# Patient Record
Sex: FEMALE | Race: WHITE | Employment: OTHER | URBAN - METROPOLITAN AREA
[De-identification: names, ages, dates, MRNs, and addresses within clinical notes are randomized per-mention and may not be internally consistent; named-entity substitution may affect disease eponyms.]

---

## 2017-01-13 ENCOUNTER — ALLIED HEALTH/NURSE VISIT (OUTPATIENT)
Dept: CARDIOLOGY | Facility: CLINIC | Age: 82
End: 2017-01-13
Payer: COMMERCIAL

## 2017-01-13 DIAGNOSIS — Z95.0 CARDIAC PACEMAKER IN SITU: Primary | ICD-10-CM

## 2017-01-13 PROCEDURE — 93293 PM PHONE R-STRIP DEVICE EVAL: CPT | Performed by: INTERNAL MEDICINE

## 2017-01-13 NOTE — PROGRESS NOTES
~ 90 day phone teletrace ~  AS/ at time of check. Magnet response WNL. Will contact [employer] rep and they will do annual threshold in April at care facility. Cristiane IBRAHIM

## 2017-04-21 ENCOUNTER — DOCUMENTATION ONLY (OUTPATIENT)
Dept: CARDIOLOGY | Facility: CLINIC | Age: 82
End: 2017-04-21

## 2017-04-21 NOTE — PROGRESS NOTES
[employer] [facility] Annual Threshold Rep Check-No Charge  62% A. Paced and 100% V. Paced in mode DDDR 60. Presenting rhythm is AP/ and underlying rhythm is CHB. No mode switches or VHR's logged. Lead measurements are stable. Battery longevity is 3.5 yrs. Next phone teletrace scheduled for July. ROSA ISELA Castro RN.

## 2017-07-14 ENCOUNTER — ALLIED HEALTH/NURSE VISIT (OUTPATIENT)
Dept: CARDIOLOGY | Facility: CLINIC | Age: 82
End: 2017-07-14
Payer: COMMERCIAL

## 2017-07-14 DIAGNOSIS — Z95.0 CARDIAC PACEMAKER IN SITU: Primary | ICD-10-CM

## 2017-07-14 PROCEDURE — 93293 PM PHONE R-STRIP DEVICE EVAL: CPT | Performed by: INTERNAL MEDICINE

## 2017-07-14 NOTE — MR AVS SNAPSHOT
"              After Visit Summary   7/14/2017    Margarette Saeed    MRN: [id number]           Patient Information     Date Of Birth          12/22/1919        Visit Information        Provider Department      7/14/2017 10:45 AM LLANES TECH1 [facility]        Today's Diagnoses     Cardiac pacemaker in situ    -  1       Follow-ups after your visit        Your next 10 appointments already scheduled     Oct 13, 2017 10:30 AM CDT   Phone Device Check with LLANES TECH1   [facility] ([facility])    [address] [place] [id number] [phone]              Who to contact     If you have questions or need follow up information about today's clinic visit or your schedule please contact [facility] directly at [phone].  Normal or non-critical lab and imaging results will be communicated to you by Freespeehart, letter or phone within 4 business days after the clinic has received the results. If you do not hear from us within 7 days, please contact the clinic through Freespeehart or phone. If you have a critical or abnormal lab result, we will notify you by phone as soon as possible.  Submit refill requests through [employer] or call your pharmacy and they will forward the refill request to us. Please allow 3 business days for your refill to be completed.          Additional Information About Your Visit        MyChart Information     [employer] lets you send messages to your doctor, view your test results, renew your prescriptions, schedule appointments and more. To sign up, go to [email or web address] . Click on \"Log in\" on the left side of the screen, which will take you to the Welcome page. Then click on \"Sign up Now\" on the right side of the page.     You will be asked to enter the access code listed below, as well as some personal information. Please follow the directions to create " your username and password.     Your access code is: [email or web address]  Expires: 10/12/2017 10:54 AM     Your access code will  in 90 days. If you need help or a new code, please call your [place] clinic or [phone].        Care EveryWhere ID     This is your Care EveryWhere ID. This could be used by other organizations to access your [place] medical records  [id number]         Blood Pressure from Last 3 Encounters:   11/15/16 173/78   11/20/15 132/80   09/12/15 191/88    Weight from Last 3 Encounters:   11/20/15 55.3 kg (121 lb 14.4 oz)   14 51.6 kg (113 lb 11.2 oz)   14 51.1 kg (112 lb 10.5 oz)              We Performed the Following     PM PHONE R STRIP EVAL UP TO 90 DAYS ([id number])        Primary Care Provider Office Phone # Fax #    Aniyah Cydney [phone]       [facility] [address] [facility] [address]  [facility] [address]        Equal Access to Services     MICHELLE SENA : Hadii noman ku hadasho Somegan, waaxda luqadaha, qaybta kaalmada casey, anthony dela cruz. So [facility] [phone].    ATENCIÓN: Si habla español, tiene a merino disposición servicios gratuitos de asistencia lingüística. [facility] [phone].    We comply with applicable federal civil rights laws and [place] laws. We do not discriminate on the basis of race, color, national origin, age, disability sex, sexual orientation or gender identity.            Thank you!     Thank you for choosing [facility] [place]  for your care. Our goal is always to provide you with excellent care. Hearing back from our patients is one way we can continue to improve our services. Please take a few minutes to complete the written survey that you may receive in the mail after your visit with us. Thank you!             Your Updated Medication List - Protect others around you: Learn how to safely use, store and throw away your medicines at www.disposemymeds.org.           This list is accurate as of: 7/14/17 10:54 AM.  Always use your most recent med list.                   Brand Name Dispense Instructions for use Diagnosis    acetaminophen 325 MG tablet    TYLENOL    100 tablet    Take 2 tablets (650 mg) by mouth 2 times daily as needed for pain        amoxicillin 500 MG capsule    AMOXIL     (4) po prior to dental appt. Per Dr. Natalie Almanza DDS:  1 hour prior to appt.  Administer 2000mg (4)-500 mg tablets orally prn        atenolol 50 MG tablet    TENORMIN    90 tablet    Take 1 tablet by mouth daily.    HTN (hypertension)       calcium 600 MG tablet      1 tablet 2 times daily        CERTAVITE/ANTIOXIDANTS Tabs tablet   Generic drug:  multivitamin, therapeutic with minerals      Take  by mouth. New formula 2010  mg daily        cholecalciferol 1000 UNITS Tabs      Take 1 tablet by mouth daily.        HYDROcodone-acetaminophen 5-325 MG per tablet    NORCO    15 tablet    Take 1 tablet by mouth every 8 hours as needed for moderate to severe pain        levothyroxine 50 MCG tablet    SYNTHROID/LEVOTHROID    90 tablet    Take 1 tablet by mouth daily.    Hypothyroidism       OMEGA-3 FISH OIL PO      Take 1 g by mouth daily        pantoprazole 40 MG EC tablet    PROTONIX     Take 1 tablet by mouth daily.    Duodenal ulcer with hemorrhage       simvastatin 40 MG tablet    ZOCOR    90 tablet    Take 1 tablet by mouth At Bedtime.    Hyperlipidemia LDL goal <130       SUPER B COMPLEX MAXI PO      Take 1 tablet by mouth daily.        traMADol 50 MG tablet    ULTRAM    15 tablet    Take 1 tablet (50 mg) by mouth every 6 hours as needed for moderate pain        traZODone 50 MG tablet    DESYREL    45 tablet    Take 0.5 tablets by mouth nightly as needed for sleep.    Insomnia

## 2017-07-14 NOTE — PROGRESS NOTES
~ 90 day phone teletrace ~  AP/ with bigeminal PACs. Magnet response WNL. FF/U scheduled q 3 months. Cristiane IBRAHIM

## 2017-10-13 ENCOUNTER — ALLIED HEALTH/NURSE VISIT (OUTPATIENT)
Dept: CARDIOLOGY | Facility: CLINIC | Age: 82
End: 2017-10-13
Payer: COMMERCIAL

## 2017-10-13 DIAGNOSIS — Z95.0 CARDIAC PACEMAKER IN SITU: Primary | ICD-10-CM

## 2017-10-13 PROCEDURE — 93293 PM PHONE R-STRIP DEVICE EVAL: CPT | Performed by: INTERNAL MEDICINE

## 2017-10-13 NOTE — MR AVS SNAPSHOT
"              After Visit Summary   10/13/2017    Margarette Saeed    MRN: [id number]           Patient Information     Date Of Birth          12/22/1919        Visit Information        Provider Department      10/13/2017 10:30 AM LLANES TECH1 [facility]        Today's Diagnoses     Cardiac pacemaker in situ    -  1       Follow-ups after your visit        Your next 10 appointments already scheduled     Jan 12, 2018  9:30 AM CST   Phone Device Check with LLANES TECH1   [facility] ([facility])    [address] [place] [id number] [phone]              Who to contact     If you have questions or need follow up information about today's clinic visit or your schedule please contact [facility] directly at [phone].  Normal or non-critical lab and imaging results will be communicated to you by Splendid Labhart, letter or phone within 4 business days after the clinic has received the results. If you do not hear from us within 7 days, please contact the clinic through MyChart or phone. If you have a critical or abnormal lab result, we will notify you by phone as soon as possible.  Submit refill requests through [employer] or call your pharmacy and they will forward the refill request to us. Please allow 3 business days for your refill to be completed.          Additional Information About Your Visit        Splendid Labhart Information     [employer] lets you send messages to your doctor, view your test results, renew your prescriptions, schedule appointments and more. To sign up, go to [email or web address] . Click on \"Log in\" on the left side of the screen, which will take you to the Welcome page. Then click on \"Sign up Now\" on the right side of the page.     You will be asked to enter the access code listed below, as well as some personal information. Please follow the directions to create " your username and password.     Your access code is: [email or web address]  Expires: 2018 11:48 AM     Your access code will  in 90 days. If you need help or a new code, please call your [place] clinic or [phone].        Care EveryWhere ID     This is your Care EveryWhere ID. This could be used by other organizations to access your [place] medical records  [id number]         Blood Pressure from Last 3 Encounters:   11/15/16 173/78   11/20/15 132/80   09/12/15 191/88    Weight from Last 3 Encounters:   11/20/15 55.3 kg (121 lb 14.4 oz)   14 51.6 kg (113 lb 11.2 oz)   14 51.1 kg (112 lb 10.5 oz)              We Performed the Following     PM PHONE R STRIP EVAL UP TO 90 DAYS ([id number])        Primary Care Provider Office Phone # Fax #    Aniyah Cydney [phone]       [facility] [address] [facility] [address]  [facility] [address]        Equal Access to Services     [facility] : Hadii noman mcdonnell hadwaldemar Somegan, waaxda luqadaha, qaybta kaalmadeniz peña, anthony dela cruz. So [facility] [phone].    ATENCIÓN: Si habla español, tiene a merino disposición servicios gratuitos de asistencia lingüística. [facility] [phone].    We comply with applicable federal civil rights laws and [place] laws. We do not discriminate on the basis of race, color, national origin, age, disability, sex, sexual orientation, or gender identity.            Thank you!     Thank you for choosing [facility] [place]  for your care. Our goal is always to provide you with excellent care. Hearing back from our patients is one way we can continue to improve our services. Please take a few minutes to complete the written survey that you may receive in the mail after your visit with us. Thank you!             Your Updated Medication List - Protect others around you: Learn how to safely use, store and throw away your medicines at www.disposemymeds.org.           This list is accurate as of: 10/13/17 11:48 AM.  Always use your most recent med list.                   Brand Name Dispense Instructions for use Diagnosis    acetaminophen 325 MG tablet    TYLENOL    100 tablet    Take 2 tablets (650 mg) by mouth 2 times daily as needed for pain        amoxicillin 500 MG capsule    AMOXIL     (4) po prior to dental appt. Per Dr. Natalie Almanza DDS:  1 hour prior to appt.  Administer 2000mg (4)-500 mg tablets orally prn        atenolol 50 MG tablet    TENORMIN    90 tablet    Take 1 tablet by mouth daily.    HTN (hypertension)       calcium 600 MG tablet      1 tablet 2 times daily        CERTAVITE/ANTIOXIDANTS Tabs tablet   Generic drug:  multivitamin, therapeutic with minerals      Take  by mouth. New formula 2010  mg daily        cholecalciferol 1000 UNITS Tabs      Take 1 tablet by mouth daily.        HYDROcodone-acetaminophen 5-325 MG per tablet    NORCO    15 tablet    Take 1 tablet by mouth every 8 hours as needed for moderate to severe pain        levothyroxine 50 MCG tablet    SYNTHROID/LEVOTHROID    90 tablet    Take 1 tablet by mouth daily.    Hypothyroidism       OMEGA-3 FISH OIL PO      Take 1 g by mouth daily        pantoprazole 40 MG EC tablet    PROTONIX     Take 1 tablet by mouth daily.    Duodenal ulcer with hemorrhage       simvastatin 40 MG tablet    ZOCOR    90 tablet    Take 1 tablet by mouth At Bedtime.    Hyperlipidemia LDL goal <130       SUPER B COMPLEX MAXI PO      Take 1 tablet by mouth daily.        traMADol 50 MG tablet    ULTRAM    15 tablet    Take 1 tablet (50 mg) by mouth every 6 hours as needed for moderate pain        traZODone 50 MG tablet    DESYREL    45 tablet    Take 0.5 tablets by mouth nightly as needed for sleep.    Insomnia

## 2017-10-13 NOTE — PROGRESS NOTES
~ 90 day phone teletrace ~  AS/ at time of check. Magnet response WNL. F/U scheduled q 3 months. Cristiane IBRAHIM

## 2018-01-01 ENCOUNTER — ALLIED HEALTH/NURSE VISIT (OUTPATIENT)
Dept: CARDIOLOGY | Facility: CLINIC | Age: 83
End: 2018-01-01
Payer: COMMERCIAL

## 2018-01-01 DIAGNOSIS — Z95.0 CARDIAC PACEMAKER IN SITU: Primary | ICD-10-CM

## 2018-01-01 DIAGNOSIS — R55 SYNCOPE: Primary | ICD-10-CM

## 2018-01-01 PROCEDURE — 93293 PM PHONE R-STRIP DEVICE EVAL: CPT | Performed by: INTERNAL MEDICINE

## 2018-01-12 ENCOUNTER — ALLIED HEALTH/NURSE VISIT (OUTPATIENT)
Dept: CARDIOLOGY | Facility: CLINIC | Age: 83
End: 2018-01-12
Payer: COMMERCIAL

## 2018-01-12 DIAGNOSIS — Z95.0 CARDIAC PACEMAKER IN SITU: Primary | ICD-10-CM

## 2018-01-12 PROCEDURE — 93293 PM PHONE R-STRIP DEVICE EVAL: CPT | Performed by: INTERNAL MEDICINE

## 2018-01-12 NOTE — MR AVS SNAPSHOT
"              After Visit Summary   2018    Margarette Saeed    MRN: [id number]           Patient Information     Date Of Birth          1919        Visit Information        Provider Department      2018 9:30 AM LLANES TECH1 [facility]        Today's Diagnoses     Cardiac pacemaker in situ    -  1       Follow-ups after your visit        Who to contact     If you have questions or need follow up information about today's clinic visit or your schedule please contact [facility] directly at [phone].  Normal or non-critical lab and imaging results will be communicated to you by GoingOnhart, letter or phone within 4 business days after the clinic has received the results. If you do not hear from us within 7 days, please contact the clinic through [employer] or phone. If you have a critical or abnormal lab result, we will notify you by phone as soon as possible.  Submit refill requests through [employer] or call your pharmacy and they will forward the refill request to us. Please allow 3 business days for your refill to be completed.          Additional Information About Your Visit        MyChart Information     [employer] lets you send messages to your doctor, view your test results, renew your prescriptions, schedule appointments and more. To sign up, go to [email or web address] . Click on \"Log in\" on the left side of the screen, which will take you to the Welcome page. Then click on \"Sign up Now\" on the right side of the page.     You will be asked to enter the access code listed below, as well as some personal information. Please follow the directions to create your username and password.     Your access code is: [email or web address]  Expires: 2018 10:35 AM     Your access code will  in 90 days. If you need help or a new code, please call your [place] clinic or [phone].        Care EveryWhere ID     This is your Care " EveryWhere ID. This could be used by other organizations to access your [place] medical records  [id number]         Blood Pressure from Last 3 Encounters:   11/15/16 173/78   11/20/15 132/80   09/12/15 191/88    Weight from Last 3 Encounters:   11/20/15 55.3 kg (121 lb 14.4 oz)   06/16/14 51.6 kg (113 lb 11.2 oz)   06/03/14 51.1 kg (112 lb 10.5 oz)              We Performed the Following     PM PHONE R STRIP EVAL UP TO 90 DAYS ([id number])        Primary Care Provider Office Phone # Fax #    Aniyah MD Cydney [phone]       [facility] [address] [place] [id number]        Equal Access to Services     XAVIER SENA : Hadii aad ku hadasho Somegan, waaxda luqadaha, qaybta kaalmada adeegyada, anthony dela cruz. So [facility] [phone].    ATENCIÓN: Si habla español, tiene a merino disposición servicios gratuitos de asistencia lingüística. Jak al [phone].    We comply with applicable federal civil rights laws and [place] laws. We do not discriminate on the basis of race, color, national origin, age, disability, sex, sexual orientation, or gender identity.            Thank you!     Thank you for choosing [facility]  for your care. Our goal is always to provide you with excellent care. Hearing back from our patients is one way we can continue to improve our services. Please take a few minutes to complete the written survey that you may receive in the mail after your visit with us. Thank you!             Your Updated Medication List - Protect others around you: Learn how to safely use, store and throw away your medicines at www.disposemymeds.org.          This list is accurate as of: 1/12/18 10:35 AM.  Always use your most recent med list.                   Brand Name Dispense Instructions for use Diagnosis    acetaminophen 325 MG tablet    TYLENOL    100 tablet    Take 2 tablets (650 mg) by mouth 2 times daily as needed for pain         amoxicillin 500 MG capsule    AMOXIL     (4) po prior to dental appt. Per Dr. Natalie Almanza DDS:  1 hour prior to appt.  Administer 2000mg (4)-500 mg tablets orally prn        atenolol 50 MG tablet    TENORMIN    90 tablet    Take 1 tablet by mouth daily.    HTN (hypertension)       calcium 600 MG tablet      1 tablet 2 times daily        CERTAVITE/ANTIOXIDANTS Tabs tablet   Generic drug:  multivitamin, therapeutic with minerals      Take  by mouth. New formula 2010  mg daily        cholecalciferol 1000 UNITS Tabs      Take 1 tablet by mouth daily.        HYDROcodone-acetaminophen 5-325 MG per tablet    NORCO    15 tablet    Take 1 tablet by mouth every 8 hours as needed for moderate to severe pain        levothyroxine 50 MCG tablet    SYNTHROID/LEVOTHROID    90 tablet    Take 1 tablet by mouth daily.    Hypothyroidism       OMEGA-3 FISH OIL PO      Take 1 g by mouth daily        pantoprazole 40 MG EC tablet    PROTONIX     Take 1 tablet by mouth daily.    Duodenal ulcer with hemorrhage       simvastatin 40 MG tablet    ZOCOR    90 tablet    Take 1 tablet by mouth At Bedtime.    Hyperlipidemia LDL goal <130       SUPER B COMPLEX MAXI PO      Take 1 tablet by mouth daily.        traMADol 50 MG tablet    ULTRAM    15 tablet    Take 1 tablet (50 mg) by mouth every 6 hours as needed for moderate pain        traZODone 50 MG tablet    DESYREL    45 tablet    Take 0.5 tablets by mouth nightly as needed for sleep.    Insomnia

## 2018-01-12 NOTE — PROGRESS NOTES
~ 90 day phone teletrace ~  AP/ and AS/ at time of check. Magnet response WNL. Will notify [employer] to do annual threshold in April. Cristiane IBRAHIM

## 2018-04-19 ENCOUNTER — DOCUMENTATION ONLY (OUTPATIENT)
Dept: CARDIOLOGY | Facility: CLINIC | Age: 83
End: 2018-04-19

## 2018-04-19 NOTE — PROGRESS NOTES
Nursing home check - Success     Presenting AP/  AP 84%  100%  histogram - minimal variability   3.5 years battery   Impedance, Sensing, and Threshold - WNL  2 brief mode switches 0% of the time    Left voicemail with memory care to call back to schedule a phone check. SP

## 2018-07-19 ENCOUNTER — ALLIED HEALTH/NURSE VISIT (OUTPATIENT)
Dept: CARDIOLOGY | Facility: CLINIC | Age: 83
End: 2018-07-19
Payer: COMMERCIAL

## 2018-07-19 DIAGNOSIS — Z95.0 CARDIAC PACEMAKER IN SITU: Primary | ICD-10-CM

## 2018-07-19 PROCEDURE — 93293 PM PHONE R-STRIP DEVICE EVAL: CPT | Performed by: INTERNAL MEDICINE

## 2018-07-19 NOTE — NURSING NOTE
~ 90 day phone teletrace ~  AP/VS at time of check. Magnet response WNL.FU 3 mo phone teletrace.

## 2018-07-19 NOTE — MR AVS SNAPSHOT
"              After Visit Summary   7/19/2018    Margarette Saeed    MRN: [id number]           Patient Information     Date Of Birth          12/22/1919        Visit Information        Provider Department      7/19/2018 1:30 PM MARIO ALBERTO WESTON [facility]        Today's Diagnoses     Cardiac pacemaker in situ    -  1       Follow-ups after your visit        Your next 10 appointments already scheduled     Oct 25, 2018  1:30 PM CDT   Phone Device Check with MARIO ALBERTO WESTON   [facility] ([facility])    [address] [place] [address] [id number] [phone] OPT 2              Who to contact     If you have questions or need follow up information about today's clinic visit or your schedule please contact [facility] directly at [phone].  Normal or non-critical lab and imaging results will be communicated to you by [employer], letter or phone within 4 business days after the clinic has received the results. If you do not hear from us within 7 days, please contact the clinic through [employer] or phone. If you have a critical or abnormal lab result, we will notify you by phone as soon as possible.  Submit refill requests through [employer] or call your pharmacy and they will forward the refill request to us. Please allow 3 business days for your refill to be completed.          Additional Information About Your Visit        MyChart Information     [employer] lets you send messages to your doctor, view your test results, renew your prescriptions, schedule appointments and more. To sign up, go to [email or web address] . Click on \"Log in\" on the left side of the screen, which will take you to the Welcome page. Then click on \"Sign up Now\" on the right side of the page.     You will be asked to enter the access code listed below, as well as some personal information. Please follow the directions to create your " username and password.     Your access code is: [email or web address]  Expires: 10/17/2018  1:41 PM     Your access code will  in 90 days. If you need help or a new code, please call your [facility] clinic or [phone].        Care EveryWhere ID     This is your Care EveryWhere ID. This could be used by other organizations to access your [facility] medical records  [id number]         Blood Pressure from Last 3 Encounters:   11/15/16 173/78   11/20/15 132/80   09/12/15 191/88    Weight from Last 3 Encounters:   11/20/15 55.3 kg (121 lb 14.4 oz)   14 51.6 kg (113 lb 11.2 oz)   14 51.1 kg (112 lb 10.5 oz)              We Performed the Following     PM PHONE R STRIP EVAL UP TO 90 DAYS ([id number])        Primary Care Provider Office Phone # Fax #    Aniyah MD Roel [phone]       [facility] [address] [facility] [address]        Equal Access to Services     [facility] : Hadii noman mcdonnell hadwaldemar Somegan, waaxda luede, qaybta kaaleriberto peña, anthony dela cruz. So [facility] [phone].    ATENCIÓN: Si habla español, tiene a merino disposición servicios gratuitos de asistencia lingüística. Jak al [phone].    We comply with applicable federal civil rights laws and [place] laws. We do not discriminate on the basis of race, color, national origin, age, disability, sex, sexual orientation, or gender identity.            Thank you!     Thank you for choosing [facility]  for your care. Our goal is always to provide you with excellent care. Hearing back from our patients is one way we can continue to improve our services. Please take a few minutes to complete the written survey that you may receive in the mail after your visit with us. Thank you!             Your Updated Medication List - Protect others around you: Learn how to safely use, store and throw away your medicines at www.disposemymeds.org.          This list is  accurate as of 7/19/18  1:41 PM.  Always use your most recent med list.                   Brand Name Dispense Instructions for use Diagnosis    acetaminophen 325 MG tablet    TYLENOL    100 tablet    Take 2 tablets (650 mg) by mouth 2 times daily as needed for pain        amoxicillin 500 MG capsule    AMOXIL     (4) po prior to dental appt. Per Dr. Natalie Almanza DDS:  1 hour prior to appt.  Administer 2000mg (4)-500 mg tablets orally prn        atenolol 50 MG tablet    TENORMIN    90 tablet    Take 1 tablet by mouth daily.    HTN (hypertension)       calcium 600 MG tablet      1 tablet 2 times daily        CERTAVITE/ANTIOXIDANTS Tabs tablet   Generic drug:  multivitamin, therapeutic with minerals      Take  by mouth. New formula 2010  mg daily        cholecalciferol 1000 units Tabs      Take 1 tablet by mouth daily.        HYDROcodone-acetaminophen 5-325 MG per tablet    NORCO    15 tablet    Take 1 tablet by mouth every 8 hours as needed for moderate to severe pain        levothyroxine 50 MCG tablet    SYNTHROID/LEVOTHROID    90 tablet    Take 1 tablet by mouth daily.    Hypothyroidism       OMEGA-3 FISH OIL PO      Take 1 g by mouth daily        pantoprazole 40 MG EC tablet    PROTONIX     Take 1 tablet by mouth daily.    Duodenal ulcer with hemorrhage       simvastatin 40 MG tablet    ZOCOR    90 tablet    Take 1 tablet by mouth At Bedtime.    Hyperlipidemia LDL goal <130       SUPER B COMPLEX MAXI PO      Take 1 tablet by mouth daily.        traMADol 50 MG tablet    ULTRAM    15 tablet    Take 1 tablet (50 mg) by mouth every 6 hours as needed for moderate pain        traZODone 50 MG tablet    DESYREL    45 tablet    Take 0.5 tablets by mouth nightly as needed for sleep.    Insomnia

## 2018-10-25 NOTE — PROGRESS NOTES
~ 90 day phone teletrace ~  Dual chamber paced at time of check. Magnet response WNL. F/U phone teletrace q 3 months. Pt does not leave care facility for appts. Sees facility MD. Cristiane DUGANT

## 2018-10-25 NOTE — MR AVS SNAPSHOT
"              After Visit Summary   10/25/2018    Margarette Saeed    MRN: [id number]           Patient Information     Date Of Birth          12/22/1919        Visit Information        Provider Department      10/25/2018 1:30 PM MARIO ALBERTO WESTON [facility]        Today's Diagnoses     Cardiac pacemaker in situ    -  1       Follow-ups after your visit        Your next 10 appointments already scheduled     Jan 31, 2019  2:00 PM CST   Phone Device Check with LLANES TECH1   [facility] ([facility])    [address] [place], [place] [address]  [facility] [address] [phone] OPT 2              Who to contact     If you have questions or need follow up information about today's clinic visit or your schedule please contact [facility] directly at [phone].  Normal or non-critical lab and imaging results will be communicated to you by [employer], letter or phone within 4 business days after the clinic has received the results. If you do not hear from us within 7 days, please contact the clinic through [employer] or phone. If you have a critical or abnormal lab result, we will notify you by phone as soon as possible.  Submit refill requests through [employer] or call your pharmacy and they will forward the refill request to us. Please allow 3 business days for your refill to be completed.          Additional Information About Your Visit        [employer]art Information     [employer] lets you send messages to your doctor, view your test results, renew your prescriptions, schedule appointments and more. To sign up, go to [email or web address] . Click on \"Log in\" on the left side of the screen, which will take you to the Welcome page. Then click on \"Sign up Now\" on the right side of the page.     You will be asked to enter the access code listed below, as well as some personal information. Please follow the directions to create " your username and password.     Your access code is: [email or web address]  Expires: 2019  2:20 PM     Your access code will  in 90 days. If you need help or a new code, please call your [place] clinic or [phone].        Care EveryWhere ID     This is your Care EveryWhere ID. This could be used by other organizations to access your [place] medical records  [id number]         Blood Pressure from Last 3 Encounters:   11/15/16 173/78   11/20/15 132/80   09/12/15 191/88    Weight from Last 3 Encounters:   11/20/15 55.3 kg (121 lb 14.4 oz)   14 51.6 kg (113 lb 11.2 oz)   14 51.1 kg (112 lb 10.5 oz)              We Performed the Following     PM PHONE R STRIP EVAL UP TO 90 DAYS ([id number])        Primary Care Provider Office Phone # Fax #    Aniyah MD Roel [phone]       [facility] [address] [facility] [address]        Equal Access to Services     [facility] : Hadii noman durham Somegan, waaxda luede, qaybta kaaleriberto peña, anthony dela cruz. So [facility] [phone].    ATENCIÓN: Si habla español, tiene a merino disposición servicios gratuitos de asistencia lingüística. Jak al [phone].    We comply with applicable federal civil rights laws and [place] laws. We do not discriminate on the basis of race, color, national origin, age, disability, sex, sexual orientation, or gender identity.            Thank you!     Thank you for choosing [facility]  for your care. Our goal is always to provide you with excellent care. Hearing back from our patients is one way we can continue to improve our services. Please take a few minutes to complete the written survey that you may receive in the mail after your visit with us. Thank you!             Your Updated Medication List - Protect others around you: Learn how to safely use, store and throw away your medicines at www.disposemymeds.org.          This list is  accurate as of 10/25/18  2:20 PM.  Always use your most recent med list.                   Brand Name Dispense Instructions for use Diagnosis    acetaminophen 325 MG tablet    TYLENOL    100 tablet    Take 2 tablets (650 mg) by mouth 2 times daily as needed for pain        amoxicillin 500 MG capsule    AMOXIL     (4) po prior to dental appt. Per Dr. Natalie Almanza DDS:  1 hour prior to appt.  Administer 2000mg (4)-500 mg tablets orally prn        atenolol 50 MG tablet    TENORMIN    90 tablet    Take 1 tablet by mouth daily.    HTN (hypertension)       calcium 600 MG tablet      1 tablet 2 times daily        CERTAVITE/ANTIOXIDANTS Tabs tablet   Generic drug:  multivitamin, therapeutic with minerals      Take  by mouth. New formula 2010  mg daily        cholecalciferol 1000 units Tabs      Take 1 tablet by mouth daily.        HYDROcodone-acetaminophen 5-325 MG per tablet    NORCO    15 tablet    Take 1 tablet by mouth every 8 hours as needed for moderate to severe pain        levothyroxine 50 MCG tablet    SYNTHROID/LEVOTHROID    90 tablet    Take 1 tablet by mouth daily.    Hypothyroidism       OMEGA-3 FISH OIL PO      Take 1 g by mouth daily        pantoprazole 40 MG EC tablet    PROTONIX     Take 1 tablet by mouth daily.    Duodenal ulcer with hemorrhage       simvastatin 40 MG tablet    ZOCOR    90 tablet    Take 1 tablet by mouth At Bedtime.    Hyperlipidemia LDL goal <130       SUPER B COMPLEX MAXI PO      Take 1 tablet by mouth daily.        traMADol 50 MG tablet    ULTRAM    15 tablet    Take 1 tablet (50 mg) by mouth every 6 hours as needed for moderate pain        traZODone 50 MG tablet    DESYREL    45 tablet    Take 0.5 tablets by mouth nightly as needed for sleep.    Insomnia

## 2019-01-01 ENCOUNTER — ANCILLARY PROCEDURE (OUTPATIENT)
Dept: CARDIOLOGY | Facility: CLINIC | Age: 84
End: 2019-01-01
Payer: COMMERCIAL

## 2019-01-01 ENCOUNTER — ANCILLARY PROCEDURE (OUTPATIENT)
Dept: CARDIOLOGY | Facility: CLINIC | Age: 84
End: 2019-01-01
Attending: INTERNAL MEDICINE
Payer: COMMERCIAL

## 2019-01-01 ENCOUNTER — DOCUMENTATION ONLY (OUTPATIENT)
Dept: CARDIOLOGY | Facility: CLINIC | Age: 84
End: 2019-01-01

## 2019-01-01 DIAGNOSIS — R55 SYNCOPE: ICD-10-CM

## 2019-01-01 DIAGNOSIS — Z95.0 PACEMAKER: ICD-10-CM

## 2019-01-01 LAB
MDC_IDC_LEAD_IMPLANT_DT: NORMAL
MDC_IDC_LEAD_LOCATION: NORMAL
MDC_IDC_LEAD_MFG: NORMAL
MDC_IDC_LEAD_MODEL: NORMAL
MDC_IDC_LEAD_POLARITY_TYPE: NORMAL
MDC_IDC_LEAD_SERIAL: NORMAL
MDC_IDC_LEAD_SPECIAL_FUNCTION: NORMAL
MDC_IDC_PG_IMPLANT_DTM: NORMAL
MDC_IDC_PG_IMPLANT_DTM: NORMAL
MDC_IDC_PG_MFG: NORMAL
MDC_IDC_PG_MFG: NORMAL
MDC_IDC_PG_MODEL: NORMAL
MDC_IDC_PG_MODEL: NORMAL
MDC_IDC_PG_SERIAL: NORMAL
MDC_IDC_PG_SERIAL: NORMAL
MDC_IDC_PG_TYPE: NORMAL
MDC_IDC_PG_TYPE: NORMAL
MDC_IDC_SESS_CLINIC_NAME: NORMAL
MDC_IDC_SESS_CLINIC_NAME: NORMAL
MDC_IDC_SESS_DTM: NORMAL
MDC_IDC_SESS_DTM: NORMAL
MDC_IDC_SESS_TYPE: NORMAL
MDC_IDC_SESS_TYPE: NORMAL

## 2019-01-01 PROCEDURE — 93293 PM PHONE R-STRIP DEVICE EVAL: CPT | Performed by: INTERNAL MEDICINE

## 2019-05-06 NOTE — TELEPHONE ENCOUNTER
Device check done by Rep at [facility].   Pacemaker Device Check  AP:92  :100  Mode:DDDR  Underlying Rhythm: CHB with vent rate < 30  Heart Rate: Minimal variation per histogram   Sensing: No R waves at 30  Pacing Threshold: Stable  Impedance: stable  Battery Status: 2.5 years    Atrial Arrhythmia: 2 mode switches for PAF lasting < 30 sec.   Ventricular Arrhythmia: none  Setting Change: none    Care Plan: follow up  3 months TTM. Pt is unable to come to clinic for MD visits and device checks.

## 2019-05-06 NOTE — TELEPHONE ENCOUNTER
Called and left message with daughter Melissa asking about setting up phone check in 3 months. Should it be set up with her or with care center. Also called care center and set up next phone check with them.